# Patient Record
Sex: FEMALE | Race: WHITE | NOT HISPANIC OR LATINO | Employment: FULL TIME | URBAN - METROPOLITAN AREA
[De-identification: names, ages, dates, MRNs, and addresses within clinical notes are randomized per-mention and may not be internally consistent; named-entity substitution may affect disease eponyms.]

---

## 2017-02-20 ENCOUNTER — OFFICE VISIT (OUTPATIENT)
Dept: OBSTETRICS AND GYNECOLOGY | Facility: CLINIC | Age: 57
End: 2017-02-20

## 2017-02-20 ENCOUNTER — APPOINTMENT (OUTPATIENT)
Dept: MAMMOGRAPHY | Facility: CLINIC | Age: 57
End: 2017-02-20

## 2017-02-20 VITALS
BODY MASS INDEX: 28.32 KG/M2 | WEIGHT: 170 LBS | HEIGHT: 65 IN | DIASTOLIC BLOOD PRESSURE: 70 MMHG | SYSTOLIC BLOOD PRESSURE: 110 MMHG

## 2017-02-20 DIAGNOSIS — Z12.31 VISIT FOR SCREENING MAMMOGRAM: ICD-10-CM

## 2017-02-20 DIAGNOSIS — Z78.0 MENOPAUSE: ICD-10-CM

## 2017-02-20 DIAGNOSIS — Z12.11 SCREENING FOR MALIGNANT NEOPLASM OF COLON: ICD-10-CM

## 2017-02-20 DIAGNOSIS — Z80.3 FAMILY HISTORY OF BREAST CANCER IN MOTHER: ICD-10-CM

## 2017-02-20 DIAGNOSIS — Z01.419 ENCOUNTER FOR GYNECOLOGICAL EXAMINATION WITHOUT ABNORMAL FINDING: Primary | ICD-10-CM

## 2017-02-20 PROCEDURE — 99396 PREV VISIT EST AGE 40-64: CPT | Performed by: OBSTETRICS & GYNECOLOGY

## 2017-02-20 PROCEDURE — 77067 SCR MAMMO BI INCL CAD: CPT | Performed by: OBSTETRICS & GYNECOLOGY

## 2017-02-20 NOTE — PROGRESS NOTES
Subjective     Omaira Son is a 56 y.o. female for annual gyn exam    History of Present Illness   56-year-old white female status post total abdominal hysterectomy and unilateral salpingo-oophorectomy for uterine fibroids approximately 10 years ago presents for annual exam.  She has had mild vasomotor symptoms but is otherwise asymptomatic.  She has yet to obtain a screening colonoscopy.  Her mother was diagnosed with breast cancer at age 52.  The patient received a mammogram in the office today.  She has no complaints.      The following portions of the patient's history were reviewed and updated as appropriate: allergies, current medications, past family history, past medical history, past social history, past surgical history and problem list.      Review of Systems   Constitutional: Negative for activity change, appetite change, fatigue and unexpected weight change.   HENT: Negative.    Eyes: Negative.    Respiratory: Negative.    Cardiovascular: Negative.    Gastrointestinal: Negative for abdominal distention, abdominal pain, anal bleeding, constipation, diarrhea, nausea and vomiting.   Endocrine: Negative for cold intolerance, heat intolerance, polydipsia, polyphagia and polyuria.   Genitourinary: Negative for difficulty urinating, dysuria, flank pain, frequency, hematuria and urgency.   Musculoskeletal: Negative.    Skin: Negative.    Allergic/Immunologic: Negative.    Neurological: Negative.    Hematological: Negative.    Psychiatric/Behavioral: Negative.        Objective     Physical Exam   Constitutional: She is oriented to person, place, and time. Vital signs are normal. She appears well-developed and well-nourished. She is cooperative.   HENT:   Head: Normocephalic.   Eyes: Conjunctivae are normal. Pupils are equal, round, and reactive to light.   Neck: Normal range of motion. Neck supple. No tracheal deviation present. No thyromegaly present.   Cardiovascular: Normal rate, regular rhythm and normal  heart sounds.  Exam reveals no gallop and no friction rub.    No murmur heard.  Pulmonary/Chest: Effort normal and breath sounds normal. No respiratory distress. She has no wheezes. Right breast exhibits no inverted nipple, no mass, no nipple discharge, no skin change and no tenderness. Left breast exhibits no inverted nipple, no mass, no nipple discharge, no skin change and no tenderness. Breasts are symmetrical. There is no breast swelling.   Abdominal: Soft. Bowel sounds are normal. She exhibits no distension, no ascites and no mass. There is no hepatosplenomegaly. There is no tenderness. There is no rebound, no guarding and no CVA tenderness. No hernia. Hernia confirmed negative in the right inguinal area and confirmed negative in the left inguinal area.   Genitourinary: Rectal exam shows no fissure, no mass, no tenderness and anal tone normal. Pelvic exam was performed with patient supine. No labial fusion. There is no rash, tenderness, lesion or injury on the right labia. There is no rash, tenderness, lesion or injury on the left labia. No erythema, tenderness or bleeding in the vagina. No foreign body in the vagina. No signs of injury around the vagina. No vaginal discharge found.   Genitourinary Comments: Nose mild vulvovaginal atrophy.  The cuff is well-healed.  Bimanual exam is without mass or tenderness.   Musculoskeletal: Normal range of motion. She exhibits no edema or deformity.   Lymphadenopathy:     She has no cervical adenopathy.        Right: No inguinal adenopathy present.        Left: No inguinal adenopathy present.   Neurological: She is alert and oriented to person, place, and time. She has normal reflexes. No cranial nerve deficit. Coordination normal.   Skin: Skin is warm and dry. No rash noted. No erythema.   Psychiatric: She has a normal mood and affect. Her behavior is normal.         Omaira was seen today for gynecologic exam.    Diagnoses and all orders for this visit:    Encounter for  gynecological examination without abnormal finding  -     Pap IG, Rfx HPV ASCU    Menopause    Family history of breast cancer in mother    Screening for malignant neoplasm of colon  -     Ambulatory Referral to Gastroenterology    The patient has agreed to proceed with colonoscopy.  Appropriate referrals were made.  I recommended nutritional supplementation for bone health and the patient should get a baseline bone density screen next year.

## 2017-02-22 LAB
CONV .: NORMAL
CYTOLOGIST CVX/VAG CYTO: NORMAL
CYTOLOGY CVX/VAG DOC THIN PREP: NORMAL
DX ICD CODE: NORMAL
HIV 1 & 2 AB SER-IMP: NORMAL
OTHER STN SPEC: NORMAL
PATH REPORT.FINAL DX SPEC: NORMAL
STAT OF ADQ CVX/VAG CYTO-IMP: NORMAL

## 2018-03-26 ENCOUNTER — TRANSCRIBE ORDERS (OUTPATIENT)
Dept: ADMINISTRATIVE | Facility: HOSPITAL | Age: 58
End: 2018-03-26

## 2018-03-26 DIAGNOSIS — Z12.31 SCREENING MAMMOGRAM, ENCOUNTER FOR: Primary | ICD-10-CM

## 2018-04-16 ENCOUNTER — APPOINTMENT (OUTPATIENT)
Dept: MAMMOGRAPHY | Facility: HOSPITAL | Age: 58
End: 2018-04-16
Attending: OBSTETRICS & GYNECOLOGY

## 2018-04-16 ENCOUNTER — OFFICE VISIT (OUTPATIENT)
Dept: OBSTETRICS AND GYNECOLOGY | Facility: CLINIC | Age: 58
End: 2018-04-16

## 2018-04-16 ENCOUNTER — TRANSCRIBE ORDERS (OUTPATIENT)
Dept: ADMINISTRATIVE | Facility: HOSPITAL | Age: 58
End: 2018-04-16

## 2018-04-16 VITALS
HEIGHT: 65 IN | WEIGHT: 175 LBS | DIASTOLIC BLOOD PRESSURE: 78 MMHG | BODY MASS INDEX: 29.16 KG/M2 | SYSTOLIC BLOOD PRESSURE: 110 MMHG

## 2018-04-16 DIAGNOSIS — Z80.3 FAMILY HISTORY OF BREAST CANCER IN MOTHER: ICD-10-CM

## 2018-04-16 DIAGNOSIS — Z78.0 POST-MENOPAUSAL: Primary | ICD-10-CM

## 2018-04-16 DIAGNOSIS — Z01.419 ENCOUNTER FOR GYNECOLOGICAL EXAMINATION WITHOUT ABNORMAL FINDING: Primary | ICD-10-CM

## 2018-04-16 DIAGNOSIS — Z13.820 SCREENING FOR OSTEOPOROSIS: ICD-10-CM

## 2018-04-16 DIAGNOSIS — Z78.0 MENOPAUSE: ICD-10-CM

## 2018-04-16 PROCEDURE — 99396 PREV VISIT EST AGE 40-64: CPT | Performed by: OBSTETRICS & GYNECOLOGY

## 2018-04-16 NOTE — PROGRESS NOTES
Subjective    Omaira Son is a 57 y.o. female for annual gyn exam    History of Present Illness   57-year-old postmenopausal female presents for routine gynecologic exam.  She is status post hysterectomy with unilateral salpingo-oophorectomy for fibroids.  She is having some vasomotor symptoms but these are not disabling.  She has a family history of breast cancer in her mother.  There is no family history of colon cancer.  The patient has yet to obtain a screening colonoscopy.  She has no complaints.    The following portions of the patient's history were reviewed and updated as appropriate: allergies, current medications, past family history, past medical history, past social history, past surgical history and problem list.      Review of Systems   Constitutional: Negative for activity change, appetite change, fatigue and unexpected weight change.   HENT: Negative.    Eyes: Negative.    Respiratory: Negative.    Cardiovascular: Negative.    Gastrointestinal: Negative for abdominal distention, abdominal pain, anal bleeding, constipation, diarrhea, nausea and vomiting.   Endocrine: Negative for cold intolerance, heat intolerance, polydipsia, polyphagia and polyuria.   Genitourinary: Negative for difficulty urinating, dysuria, flank pain, frequency, hematuria, urgency, vaginal bleeding and vaginal discharge.   Musculoskeletal: Negative.    Skin: Negative.    Allergic/Immunologic: Negative.    Neurological: Negative.    Hematological: Negative.    Psychiatric/Behavioral: Negative.            Physical Exam   Constitutional: She is oriented to person, place, and time. Vital signs are normal. She appears well-developed and well-nourished. She is cooperative.   HENT:   Head: Normocephalic.   Eyes: Conjunctivae are normal. Pupils are equal, round, and reactive to light.   Neck: Normal range of motion. Neck supple. No tracheal deviation present. No thyromegaly present.   Cardiovascular: Normal rate, regular rhythm and  normal heart sounds.  Exam reveals no gallop and no friction rub.    No murmur heard.  Pulmonary/Chest: Effort normal and breath sounds normal. No respiratory distress. She has no wheezes. Right breast exhibits no inverted nipple, no mass, no nipple discharge, no skin change and no tenderness. Left breast exhibits no inverted nipple, no mass, no nipple discharge, no skin change and no tenderness. Breasts are symmetrical. There is no breast swelling.   Abdominal: Soft. Bowel sounds are normal. She exhibits no distension, no ascites and no mass. There is no hepatosplenomegaly. There is no tenderness. There is no rebound, no guarding and no CVA tenderness. No hernia. Hernia confirmed negative in the right inguinal area and confirmed negative in the left inguinal area.   Genitourinary: Rectal exam shows no external hemorrhoid, no fissure, no mass, no tenderness and anal tone normal. Pelvic exam was performed with patient supine. No labial fusion. There is no rash, tenderness, lesion or injury on the right labia. There is no rash, tenderness, lesion or injury on the left labia. Right adnexum displays no mass, no tenderness and no fullness. No erythema, tenderness or bleeding in the vagina. No foreign body in the vagina. No signs of injury around the vagina. No vaginal discharge found.   Genitourinary Comments: There is mild vulvovaginal atrophy.  The vaginal cuff is well-healed.  Bimanual exam is without mass or tenderness.   Musculoskeletal: Normal range of motion. She exhibits no edema or deformity.   Lymphadenopathy:     She has no cervical adenopathy.        Right: No inguinal adenopathy present.        Left: No inguinal adenopathy present.   Neurological: She is alert and oriented to person, place, and time. She has normal reflexes. No cranial nerve deficit. Coordination normal.   Skin: Skin is warm and dry. No rash noted. No erythema.   Psychiatric: She has a normal mood and affect. Her behavior is normal.          Omaira was seen today for gynecologic exam.    Diagnoses and all orders for this visit:    Encounter for gynecological examination without abnormal finding  -     Pap IG, Rfx HPV ASCU    Screening for osteoporosis  -     HM DEXA SCAN; Future    Menopause    Family history of breast cancer in mother     patient is doing well.  She has a screening mammogram scheduled.  We will arrange for a baseline DEXA scan.  I encouraged nutritional supplementation and weightbearing exercise for bone health.  Annual exams were recommended.

## 2018-05-08 ENCOUNTER — HOSPITAL ENCOUNTER (OUTPATIENT)
Dept: BONE DENSITY | Facility: HOSPITAL | Age: 58
Discharge: HOME | End: 2018-05-08
Attending: OBSTETRICS & GYNECOLOGY

## 2018-05-08 ENCOUNTER — APPOINTMENT (OUTPATIENT)
Dept: MAMMOGRAPHY | Facility: HOSPITAL | Age: 58
End: 2018-05-08
Attending: OBSTETRICS & GYNECOLOGY

## 2018-05-22 ENCOUNTER — HOSPITAL ENCOUNTER (OUTPATIENT)
Dept: MAMMOGRAPHY | Facility: HOSPITAL | Age: 58
Discharge: HOME | End: 2018-05-22
Attending: OBSTETRICS & GYNECOLOGY | Admitting: OBSTETRICS & GYNECOLOGY

## 2018-05-22 ENCOUNTER — HOSPITAL ENCOUNTER (OUTPATIENT)
Dept: BONE DENSITY | Facility: HOSPITAL | Age: 58
End: 2018-05-22
Attending: OBSTETRICS & GYNECOLOGY

## 2018-05-22 DIAGNOSIS — Z12.31 SCREENING MAMMOGRAM, ENCOUNTER FOR: ICD-10-CM

## 2018-05-22 PROCEDURE — 77063 BREAST TOMOSYNTHESIS BI: CPT

## 2018-05-22 PROCEDURE — 77067 SCR MAMMO BI INCL CAD: CPT

## 2018-06-18 ENCOUNTER — TELEPHONE (OUTPATIENT)
Dept: OBSTETRICS AND GYNECOLOGY | Facility: CLINIC | Age: 58
End: 2018-06-18

## 2018-06-18 NOTE — TELEPHONE ENCOUNTER
----- Message from Dexter De La Fuente MD sent at 6/14/2018  1:41 PM EDT -----  Regarding: RE: pt called regarding test tomorrow  Contact: [phone]  Tell Pt we can wait on dexa if she prefers.  She's low risk  ----- Message -----  From: Anya NEELA Garcia  Sent: 5/21/2018   1:41 PM  To: Dexter De La Fuente MD, hCely Ham MA  Subject: pt called regarding test tomorrow                Pt called today stating due to have a bone density tomorrow at the hospital but her insurance states will not cover until she turns 62. Wanted to know if there was another reason she was having this done earlier? Also, says you wanted her to have a colonoscopy and her insurance states will pay for a cologuard instead. Wanted a call back to know what to do for these. thx    Aware she can wait to get bone scan

## 2019-08-05 ENCOUNTER — PROCEDURE VISIT (OUTPATIENT)
Dept: OBSTETRICS AND GYNECOLOGY | Facility: CLINIC | Age: 59
End: 2019-08-05

## 2019-08-05 ENCOUNTER — APPOINTMENT (OUTPATIENT)
Dept: WOMENS IMAGING | Facility: HOSPITAL | Age: 59
End: 2019-08-05

## 2019-08-05 ENCOUNTER — OFFICE VISIT (OUTPATIENT)
Dept: OBSTETRICS AND GYNECOLOGY | Facility: CLINIC | Age: 59
End: 2019-08-05

## 2019-08-05 VITALS
SYSTOLIC BLOOD PRESSURE: 110 MMHG | BODY MASS INDEX: 28.32 KG/M2 | HEIGHT: 65 IN | WEIGHT: 170 LBS | DIASTOLIC BLOOD PRESSURE: 70 MMHG

## 2019-08-05 DIAGNOSIS — Z01.419 ENCOUNTER FOR GYNECOLOGICAL EXAMINATION WITHOUT ABNORMAL FINDING: Primary | ICD-10-CM

## 2019-08-05 DIAGNOSIS — Z78.0 MENOPAUSE: ICD-10-CM

## 2019-08-05 DIAGNOSIS — Z12.31 VISIT FOR SCREENING MAMMOGRAM: Primary | ICD-10-CM

## 2019-08-05 DIAGNOSIS — Z80.3 FAMILY HISTORY OF BREAST CANCER IN MOTHER: ICD-10-CM

## 2019-08-05 PROCEDURE — 77067 SCR MAMMO BI INCL CAD: CPT | Performed by: OBSTETRICS & GYNECOLOGY

## 2019-08-05 PROCEDURE — 99396 PREV VISIT EST AGE 40-64: CPT | Performed by: OBSTETRICS & GYNECOLOGY

## 2019-08-05 PROCEDURE — 77067 SCR MAMMO BI INCL CAD: CPT | Performed by: RADIOLOGY

## 2019-08-05 RX ORDER — ATORVASTATIN CALCIUM 10 MG/1
10 TABLET, FILM COATED ORAL DAILY
Refills: 0 | COMMUNITY
Start: 2019-06-04 | End: 2021-09-15

## 2019-08-05 NOTE — PROGRESS NOTES
Subjective    Omaira Son is a 58 y.o. female for annual gyn exam    History of Present Illness   58-year-old postmenopausal white female presents for routine gynecologic exam.  She denies vaginal bleeding.  She is still having some vasomotor symptoms.  She is status post TIMUR left S&O in 2006 for uterine fibroids.  The patient received mammogram in the office today.  She has noticed some thickening just underneath the right breast over the past several months.  She denies any nipple discharge or pain.  There is a family history of breast cancer in her mother in her mid 50s.  There is no family history of colon cancer.  The patient has yet to obtain a screening colonoscopy.      The following portions of the patient's history were reviewed and updated as appropriate: allergies, current medications, past family history, past medical history, past social history, past surgical history and problem list.      Review of Systems   Constitutional: Negative for activity change, appetite change, fatigue and unexpected weight change.   HENT: Negative.    Eyes: Negative.    Respiratory: Negative.    Cardiovascular: Negative.    Gastrointestinal: Negative for abdominal distention, abdominal pain, anal bleeding, constipation, diarrhea, nausea and vomiting.   Endocrine: Negative for cold intolerance, heat intolerance, polydipsia, polyphagia and polyuria.   Genitourinary: Negative for difficulty urinating, dysuria, flank pain, frequency, hematuria, pelvic pain, urgency, vaginal bleeding and vaginal discharge.   Musculoskeletal: Negative.    Skin: Negative.    Allergic/Immunologic: Negative.    Neurological: Negative.    Hematological: Negative.    Psychiatric/Behavioral: Negative.            Physical Exam   Constitutional: She is oriented to person, place, and time. Vital signs are normal. She appears well-developed and well-nourished. She is cooperative.   HENT:   Head: Normocephalic.   Eyes: Conjunctivae are normal. Pupils  are equal, round, and reactive to light.   Neck: Normal range of motion. Neck supple. No tracheal deviation present. No thyromegaly present.   Cardiovascular: Normal rate, regular rhythm and normal heart sounds. Exam reveals no gallop and no friction rub.   No murmur heard.  Pulmonary/Chest: Effort normal and breath sounds normal. No respiratory distress. She has no wheezes. Right breast exhibits no inverted nipple, no mass, no nipple discharge, no skin change and no tenderness. Left breast exhibits no inverted nipple, no mass, no nipple discharge, no skin change and no tenderness. Breasts are symmetrical. There is no breast swelling.   There is bilateral thickening of the breast tissue at the base of each breast which is more pronounced on the right than the left.  No definite masses were palpable.  There was no tenderness or nipple discharge.  The axilla are without mass.       Abdominal: Soft. Bowel sounds are normal. She exhibits no distension, no ascites and no mass. There is no hepatosplenomegaly. There is no tenderness. There is no rebound, no guarding and no CVA tenderness. No hernia. Hernia confirmed negative in the right inguinal area and confirmed negative in the left inguinal area.   Genitourinary: Rectal exam shows no fissure, no mass, no tenderness and anal tone normal. Pelvic exam was performed with patient supine. No labial fusion. There is no rash, tenderness, lesion or injury on the right labia. There is no rash, tenderness, lesion or injury on the left labia. Right adnexum displays no mass, no tenderness and no fullness. No erythema, tenderness or bleeding in the vagina. No foreign body in the vagina. No signs of injury around the vagina. No vaginal discharge found.   Genitourinary Comments: There is mild vulvovaginal atrophy.  The vaginal cuff is well-healed.  Bimanual exam is without mass or tenderness.   Musculoskeletal: Normal range of motion. She exhibits no edema or deformity.    Lymphadenopathy:     She has no cervical adenopathy.        Right: No inguinal adenopathy present.        Left: No inguinal adenopathy present.   Neurological: She is alert and oriented to person, place, and time. She has normal reflexes. No cranial nerve deficit. Coordination normal.   Skin: Skin is warm and dry. No rash noted. No erythema.   Psychiatric: She has a normal mood and affect. Her behavior is normal.         Omaira was seen today for gynecologic exam.    Diagnoses and all orders for this visit:    Encounter for gynecological examination without abnormal finding  -     Pap IG, Rfx HPV ASCU    Menopause    Family history of breast cancer in mother    Patient is doing well.  The breast exam was reassuring.  We will await mammogram results.  I encouraged nutritional supplementation and weightbearing exercise for bone health.  She will arrange a screening colonoscopy at her convenience.  Annual exams and mammograms were recommended.

## 2019-08-06 LAB
CONV .: NORMAL
CYTOLOGIST CVX/VAG CYTO: NORMAL
CYTOLOGY CVX/VAG DOC CYTO: NORMAL
CYTOLOGY CVX/VAG DOC THIN PREP: NORMAL
DX ICD CODE: NORMAL
HIV 1 & 2 AB SER-IMP: NORMAL
OTHER STN SPEC: NORMAL
STAT OF ADQ CVX/VAG CYTO-IMP: NORMAL

## 2019-08-08 DIAGNOSIS — N63.20 MASS OF LEFT BREAST ON MAMMOGRAM: Primary | ICD-10-CM

## 2019-08-09 ENCOUNTER — TELEPHONE (OUTPATIENT)
Dept: OBSTETRICS AND GYNECOLOGY | Facility: CLINIC | Age: 59
End: 2019-08-09

## 2019-08-09 NOTE — TELEPHONE ENCOUNTER
Discussed the mammogram findings from August 5 and the reasoning for further testing.  The patient was reassured.  Her mother had breast cancer.  She has the spot compression and ultrasound scheduled for August 15.

## 2019-08-15 ENCOUNTER — APPOINTMENT (OUTPATIENT)
Dept: WOMENS IMAGING | Facility: HOSPITAL | Age: 59
End: 2019-08-15

## 2019-08-15 DIAGNOSIS — N60.02 SOLITARY CYST OF LEFT BREAST: Primary | ICD-10-CM

## 2019-08-15 DIAGNOSIS — N63.20 MASS OF LEFT BREAST ON MAMMOGRAM: ICD-10-CM

## 2019-08-15 PROCEDURE — 76641 ULTRASOUND BREAST COMPLETE: CPT | Performed by: RADIOLOGY

## 2019-08-15 PROCEDURE — 77061 BREAST TOMOSYNTHESIS UNI: CPT | Performed by: RADIOLOGY

## 2019-08-15 PROCEDURE — G0279 TOMOSYNTHESIS, MAMMO: HCPCS | Performed by: RADIOLOGY

## 2019-08-15 PROCEDURE — 77065 DX MAMMO INCL CAD UNI: CPT | Performed by: RADIOLOGY

## 2019-08-15 PROCEDURE — MDREVIEWSP: Performed by: RADIOLOGY

## 2019-08-16 ENCOUNTER — TELEPHONE (OUTPATIENT)
Dept: OBSTETRICS AND GYNECOLOGY | Facility: CLINIC | Age: 59
End: 2019-08-16

## 2019-08-16 NOTE — TELEPHONE ENCOUNTER
We discussed the radiologic findings and recommendations.  The patient was reassured and is scheduled for the procedure on Monday.

## 2019-08-19 ENCOUNTER — APPOINTMENT (OUTPATIENT)
Dept: WOMENS IMAGING | Facility: HOSPITAL | Age: 59
End: 2019-08-19

## 2019-08-19 PROCEDURE — 19083 BX BREAST 1ST LESION US IMAG: CPT | Performed by: RADIOLOGY

## 2019-08-21 ENCOUNTER — TELEPHONE (OUTPATIENT)
Dept: OBSTETRICS AND GYNECOLOGY | Facility: CLINIC | Age: 59
End: 2019-08-21

## 2019-08-22 DIAGNOSIS — N60.02 SOLITARY CYST OF LEFT BREAST: ICD-10-CM

## 2019-12-18 ENCOUNTER — TELEPHONE (OUTPATIENT)
Dept: OBSTETRICS AND GYNECOLOGY | Facility: CLINIC | Age: 59
End: 2019-12-18

## 2019-12-18 ENCOUNTER — OFFICE VISIT (OUTPATIENT)
Dept: OBSTETRICS AND GYNECOLOGY | Facility: CLINIC | Age: 59
End: 2019-12-18

## 2019-12-18 VITALS
BODY MASS INDEX: 28.32 KG/M2 | DIASTOLIC BLOOD PRESSURE: 70 MMHG | HEIGHT: 65 IN | SYSTOLIC BLOOD PRESSURE: 110 MMHG | WEIGHT: 170 LBS

## 2019-12-18 DIAGNOSIS — N63.20 BREAST MASS, LEFT: Primary | ICD-10-CM

## 2019-12-18 DIAGNOSIS — Z80.3 FAMILY HISTORY OF BREAST CANCER IN MOTHER: ICD-10-CM

## 2019-12-18 PROCEDURE — 99213 OFFICE O/P EST LOW 20 MIN: CPT | Performed by: OBSTETRICS & GYNECOLOGY

## 2019-12-18 RX ORDER — IBUPROFEN 800 MG/1
800 TABLET ORAL EVERY 6 HOURS PRN
Qty: 30 TABLET | Refills: 0 | Status: SHIPPED | OUTPATIENT
Start: 2019-12-18 | End: 2020-08-31

## 2019-12-18 NOTE — TELEPHONE ENCOUNTER
Patient called and wants to know if you can call a prescription in for her for Ibuprofen 800 to the [facility]'s pharmacy on file.

## 2019-12-18 NOTE — PROGRESS NOTES
Subjective   Omaira Son is a 59 y.o. female with pain under left nipple    History of Present Illness   59-year-old postmenopausal white female presents with a one-week history of pain and a tender area just underneath her left nipple.  She denies any nipple discharge or skin changes.  She recently had a ultrasound-guided biopsy of the left breast at the 2 o'clock position in August 2019 which revealed fibroglandular adipose tissue.  There is a family history of breast cancer in her mother at age 57.    The following portions of the patient's history were reviewed and updated as appropriate: allergies, current medications, past family history, past medical history, past social history, past surgical history and problem list.    Review of Systems   Skin: Negative for color change.        No dimpling       Objective   Physical Exam   Pulmonary/Chest:       The breasts were examined in both the sitting and supine positions.  The breasts are equal in size, shape, and contour.  There are no visible skin changes.  The right breast reveals mild fibrocystic changes without dominant mass or tenderness.  The right axilla is without mass.  The left breast reveals diffuse fibrocystic changes and there is a tender thickening at 2:00 under the areola measuring approximately 8 mm.  There is no nipple discharge or skin changes or nipple cracking or bleeding.  The left axilla is without mass or tenderness.    Assessment/Plan   Omaira was seen today for follow-up.    Diagnoses and all orders for this visit:    Breast mass, left  -     Mammo Diagnostic Left With CAD; Future  -     US breast left complete; Future    Family history of breast cancer in mother    We discussed her symptoms and the findings.  I recommended we obtain a diagnostic left mammogram with ultrasound of the area under the left nipple.  She will begin vitamin E supplementation.  She will call for results.

## 2020-05-22 ENCOUNTER — APPOINTMENT (OUTPATIENT)
Dept: WOMENS IMAGING | Facility: HOSPITAL | Age: 60
End: 2020-05-22

## 2020-05-22 PROCEDURE — 76641 ULTRASOUND BREAST COMPLETE: CPT | Performed by: RADIOLOGY

## 2020-08-31 ENCOUNTER — OFFICE VISIT (OUTPATIENT)
Dept: OBSTETRICS AND GYNECOLOGY | Facility: CLINIC | Age: 60
End: 2020-08-31

## 2020-08-31 ENCOUNTER — PROCEDURE VISIT (OUTPATIENT)
Dept: OBSTETRICS AND GYNECOLOGY | Facility: CLINIC | Age: 60
End: 2020-08-31

## 2020-08-31 ENCOUNTER — APPOINTMENT (OUTPATIENT)
Dept: WOMENS IMAGING | Facility: HOSPITAL | Age: 60
End: 2020-08-31

## 2020-08-31 VITALS
BODY MASS INDEX: 28.32 KG/M2 | WEIGHT: 170 LBS | DIASTOLIC BLOOD PRESSURE: 70 MMHG | HEIGHT: 65 IN | SYSTOLIC BLOOD PRESSURE: 106 MMHG

## 2020-08-31 DIAGNOSIS — Z80.3 FAMILY HISTORY OF BREAST CANCER IN MOTHER: ICD-10-CM

## 2020-08-31 DIAGNOSIS — Z01.419 ENCOUNTER FOR GYNECOLOGICAL EXAMINATION WITHOUT ABNORMAL FINDING: Primary | ICD-10-CM

## 2020-08-31 DIAGNOSIS — Z12.31 VISIT FOR SCREENING MAMMOGRAM: Primary | ICD-10-CM

## 2020-08-31 DIAGNOSIS — Z78.0 MENOPAUSE: ICD-10-CM

## 2020-08-31 PROCEDURE — 77063 BREAST TOMOSYNTHESIS BI: CPT | Performed by: RADIOLOGY

## 2020-08-31 PROCEDURE — 99396 PREV VISIT EST AGE 40-64: CPT | Performed by: OBSTETRICS & GYNECOLOGY

## 2020-08-31 PROCEDURE — 77067 SCR MAMMO BI INCL CAD: CPT | Performed by: RADIOLOGY

## 2020-08-31 PROCEDURE — 77067 SCR MAMMO BI INCL CAD: CPT | Performed by: OBSTETRICS & GYNECOLOGY

## 2020-08-31 PROCEDURE — 77063 BREAST TOMOSYNTHESIS BI: CPT | Performed by: OBSTETRICS & GYNECOLOGY

## 2020-08-31 RX ORDER — CYCLOBENZAPRINE HCL 5 MG
TABLET ORAL
COMMUNITY
Start: 2020-08-01

## 2020-08-31 NOTE — PROGRESS NOTES
Subjective    Omaira Son is a 59 y.o. female for annual gyn exam    History of Present Illness   59-year-old postmenopausal white female presents for routine gynecologic exam.  She is status post TIMUR-left S&O for fibroids in 2006.  She has mild vasomotor symptoms and denies any vaginal bleeding.  She received a mammogram in the office this morning.  She has yet to obtain a screening colonoscopy.  There is a family history of breast cancer in her mother who had a mastectomy but is doing well.  There is no family history of colon cancer.  She has no complaints.      The following portions of the patient's history were reviewed and updated as appropriate: allergies, current medications, past family history, past medical history, past social history, past surgical history and problem list.      Review of Systems   Constitutional: Negative for activity change, appetite change, fatigue and unexpected weight change.   HENT: Negative.    Eyes: Negative.    Respiratory: Negative.    Cardiovascular: Negative.    Gastrointestinal: Negative for abdominal distention, abdominal pain, anal bleeding, constipation, diarrhea, nausea and vomiting.   Endocrine: Negative for cold intolerance, heat intolerance, polydipsia, polyphagia and polyuria.   Genitourinary: Negative for difficulty urinating, dysuria, flank pain, frequency, hematuria, pelvic pain, urgency, vaginal bleeding and vaginal discharge.   Musculoskeletal: Negative.    Skin: Negative.    Allergic/Immunologic: Negative.    Neurological: Negative.    Hematological: Negative.    Psychiatric/Behavioral: Negative.            Physical Exam   Constitutional: She is oriented to person, place, and time. Vital signs are normal. She appears well-developed and well-nourished. She is cooperative.   HENT:   Head: Normocephalic.   Eyes: Pupils are equal, round, and reactive to light. Conjunctivae are normal.   Neck: Normal range of motion. Neck supple. No tracheal deviation present.  No thyromegaly present.   Cardiovascular: Normal rate, regular rhythm and normal heart sounds. Exam reveals no gallop and no friction rub.   No murmur heard.  Pulmonary/Chest: Effort normal and breath sounds normal. No respiratory distress. She has no wheezes. Right breast exhibits no inverted nipple, no mass, no nipple discharge, no skin change and no tenderness. Left breast exhibits no inverted nipple, no mass, no nipple discharge, no skin change and no tenderness. No breast swelling. Breasts are symmetrical.   Abdominal: Soft. Bowel sounds are normal. She exhibits no distension, no ascites and no mass. There is no hepatosplenomegaly. There is no tenderness. There is no rebound, no guarding and no CVA tenderness. No hernia. Hernia confirmed negative in the right inguinal area and confirmed negative in the left inguinal area.   Genitourinary: Rectal exam shows no external hemorrhoid, no internal hemorrhoid, no fissure, no mass, no tenderness and anal tone normal. No breast swelling. Pelvic exam was performed with patient supine. No labial fusion. There is no rash, tenderness, lesion or injury on the right labia. There is no rash, tenderness, lesion or injury on the left labia. Right adnexum displays no mass, no tenderness and no fullness. No erythema, tenderness or bleeding in the vagina. No foreign body in the vagina. No signs of injury around the vagina. No vaginal discharge found.   Genitourinary Comments: The vaginal cuff is well-healed.  Bimanual exam is without mass or tenderness.   Musculoskeletal: Normal range of motion. She exhibits no edema or deformity.   Lymphadenopathy:     She has no cervical adenopathy.        Right: No inguinal adenopathy present.        Left: No inguinal adenopathy present.   Neurological: She is alert and oriented to person, place, and time. She has normal reflexes. No cranial nerve deficit. Coordination normal.   Skin: Skin is warm and dry. No rash noted. No erythema.    Psychiatric: She has a normal mood and affect. Her behavior is normal.         Omaira was seen today for gynecologic exam.    Diagnoses and all orders for this visit:    Encounter for gynecological examination without abnormal finding  -     Pap IG, Rfx HPV ASCU    Menopause    Family history of breast cancer in mother    The patient is doing well.  I recommended she schedule a screening colonoscopy.  I encourage nutritional supplementation and weightbearing exercise for bone health.  Annual exams and mammograms were recommended.

## 2021-08-13 ENCOUNTER — TELEPHONE (OUTPATIENT)
Dept: OBSTETRICS AND GYNECOLOGY | Facility: CLINIC | Age: 61
End: 2021-08-13

## 2021-09-01 ENCOUNTER — APPOINTMENT (OUTPATIENT)
Dept: WOMENS IMAGING | Facility: HOSPITAL | Age: 61
End: 2021-09-01

## 2021-09-01 ENCOUNTER — PROCEDURE VISIT (OUTPATIENT)
Dept: OBSTETRICS AND GYNECOLOGY | Facility: CLINIC | Age: 61
End: 2021-09-01

## 2021-09-01 DIAGNOSIS — Z12.31 VISIT FOR SCREENING MAMMOGRAM: Primary | ICD-10-CM

## 2021-09-01 PROCEDURE — 77063 BREAST TOMOSYNTHESIS BI: CPT | Performed by: OBSTETRICS & GYNECOLOGY

## 2021-09-01 PROCEDURE — 77067 SCR MAMMO BI INCL CAD: CPT | Performed by: RADIOLOGY

## 2021-09-01 PROCEDURE — 77067 SCR MAMMO BI INCL CAD: CPT | Performed by: OBSTETRICS & GYNECOLOGY

## 2021-09-01 PROCEDURE — 77063 BREAST TOMOSYNTHESIS BI: CPT | Performed by: RADIOLOGY

## 2021-09-07 DIAGNOSIS — R92.8 ABNORMAL MAMMOGRAM: Primary | ICD-10-CM

## 2021-09-08 ENCOUNTER — TELEPHONE (OUTPATIENT)
Dept: OBSTETRICS AND GYNECOLOGY | Facility: CLINIC | Age: 61
End: 2021-09-08

## 2021-09-15 ENCOUNTER — OFFICE VISIT (OUTPATIENT)
Dept: OBSTETRICS AND GYNECOLOGY | Facility: CLINIC | Age: 61
End: 2021-09-15

## 2021-09-15 VITALS
WEIGHT: 163.4 LBS | DIASTOLIC BLOOD PRESSURE: 70 MMHG | SYSTOLIC BLOOD PRESSURE: 128 MMHG | BODY MASS INDEX: 30.07 KG/M2 | HEIGHT: 62 IN

## 2021-09-15 DIAGNOSIS — Z01.419 ENCOUNTER FOR GYNECOLOGICAL EXAMINATION WITHOUT ABNORMAL FINDING: Primary | ICD-10-CM

## 2021-09-15 PROCEDURE — 99396 PREV VISIT EST AGE 40-64: CPT | Performed by: OBSTETRICS & GYNECOLOGY

## 2021-09-15 RX ORDER — EZETIMIBE 10 MG/1
10 TABLET ORAL DAILY
COMMUNITY
Start: 2021-06-25

## 2021-09-15 NOTE — PROGRESS NOTES
GYN Annual Exam     CC- Here for annual exam.     Omaira Son is a 60 y.o. female who presents for annual well woman exam. She: is menopausal.  She is experiencing and/or reports much less in the way of hot flashes..  She denies postmenopausal vaginal bleeding.  She denies vomiting, abdominal pain and cough.  Today, she wishes to specifically address just her recent mammogram for which she has a follow-up diagnostic mammogram and ultrasound scheduled in early October..  I explained to her that current ACOG guidelines state that screening Pap smears are no longer recommended after the age of 65, nor after hysterectomy for benign reasons.  After this discussion, she expresses a desire to have a Pap screen done today.    OB History        2    Para   2    Term   2            AB        Living           SAB        TAB        Ectopic        Molar        Multiple        Live Births                    Current contraception: post menopausal status  History of abnormal Pap smear: no  Family history of uterine, colon or ovarian cancer: no  History of abnormal mammogram: yes - History of left breast biopsy  Family history of breast cancer: yes - Mother  Last Pap : 1 year ago  Last mammogram: Several weeks ago  Last colonoscopy:   Last DEXA: Not yet done      Past Medical History:   Diagnosis Date   • Fibroid        Past Surgical History:   Procedure Laterality Date   • HYSTERECTOMY           Current Outpatient Medications:   •  cyclobenzaprine (FLEXERIL) 5 MG tablet, TK 1 T PO BID PRN, Disp: , Rfl:   •  ezetimibe (ZETIA) 10 MG tablet, Take 10 mg by mouth Daily., Disp: , Rfl:     No Known Allergies    Social History     Tobacco Use   • Smoking status: Never Smoker   • Smokeless tobacco: Never Used   Vaping Use   • Vaping Use: Never used   Substance Use Topics   • Alcohol use: No   • Drug use: No       Family History   Problem Relation Age of Onset   • Breast cancer Mother        Review of Systems  "  Constitutional: Negative for fatigue and fever.   Respiratory: Negative for cough and shortness of breath.    Genitourinary: Negative for decreased urine volume, urinary incontinence and vaginal bleeding.       /70   Ht 157.5 cm (62\")   Wt 74.1 kg (163 lb 6.4 oz)   BMI 29.89 kg/m²     Physical Exam  Constitutional:       Appearance: She is normal weight.   Genitourinary:      Vulva, inguinal canal, urethra and bladder normal.      Bladder is not tender.      Bladder neck is mobile.      Vaginal prolapse present.      Vaginal exam comments: Stage I cystocele and rectocele.      Cervix is absent.      Uterus is absent.      No right or left adnexal mass present.      Right adnexa not tender.      Left adnexa not tender.   Neck:      Thyroid: No thyroid mass or thyromegaly.   Chest:      Breasts:         Right: No mass, nipple discharge, skin change or tenderness.         Left: No mass, nipple discharge, skin change or tenderness.   Abdominal:      General: Abdomen is flat.      Palpations: Abdomen is soft.      Tenderness: There is no abdominal tenderness.   Neurological:      Mental Status: She is alert.   Vitals reviewed.             Assessment     1) GYN annual well woman exam.   2) menopausal.  Doing very well.  3) mild pelvic organ prolapse discussed with patient.  No change in management at this time.     Plan     1) Breast Health - Clinical breast exam & mammogram reviewed specifically American Cancer Society recommendations for screening specific to her, and Self breast awareness monthly  2) Pap -done of the vaginal cuff at patient's request.  He would like to do this every other year  3) Smoking status-non-smoker  4) Colon health - screening colonoscopy recommended if not up to date  5) Bone health - Weight bearing exercise, dietary calcium recommendations and vitamin D reviewed.   6) Encouraged to be wary of information obtained via social media and internet based on source and search.   7) Follow " up prn and one year      Presley Joel MD   9/15/2021  09:46 EDT

## 2021-09-17 LAB
CONV .: NORMAL
CYTOLOGIST CVX/VAG CYTO: NORMAL
CYTOLOGY CVX/VAG DOC CYTO: NORMAL
DX ICD CODE: NORMAL
HIV 1 & 2 AB SER-IMP: NORMAL
OTHER STN SPEC: NORMAL
STAT OF ADQ CVX/VAG CYTO-IMP: NORMAL

## 2021-10-06 ENCOUNTER — APPOINTMENT (OUTPATIENT)
Dept: WOMENS IMAGING | Facility: HOSPITAL | Age: 61
End: 2021-10-06

## 2021-10-06 PROCEDURE — 77065 DX MAMMO INCL CAD UNI: CPT | Performed by: RADIOLOGY

## 2021-10-06 PROCEDURE — 77061 BREAST TOMOSYNTHESIS UNI: CPT | Performed by: RADIOLOGY

## 2021-10-06 PROCEDURE — G0279 TOMOSYNTHESIS, MAMMO: HCPCS | Performed by: RADIOLOGY

## 2021-10-06 PROCEDURE — 76641 ULTRASOUND BREAST COMPLETE: CPT | Performed by: RADIOLOGY

## 2021-10-08 ENCOUNTER — TELEPHONE (OUTPATIENT)
Dept: OBSTETRICS AND GYNECOLOGY | Facility: CLINIC | Age: 61
End: 2021-10-08

## 2021-10-08 DIAGNOSIS — R92.8 ABNORMAL MAMMOGRAM: ICD-10-CM

## 2021-10-08 DIAGNOSIS — Z80.3 FAMILY HISTORY OF BREAST CANCER IN MOTHER: ICD-10-CM

## 2021-10-08 DIAGNOSIS — N63.0 BREAST MASS IN FEMALE: Primary | ICD-10-CM

## 2021-10-08 NOTE — TELEPHONE ENCOUNTER
Daphney with [facility] called to request orders for Right Breast Cyst Aspiration w/Possible Bx.  She is faxing the report today, pt is not yet aware.

## 2021-10-18 DIAGNOSIS — N63.10 BREAST MASS, RIGHT: ICD-10-CM

## 2021-10-18 DIAGNOSIS — N64.89 BREAST ASYMMETRY: ICD-10-CM

## 2021-10-18 DIAGNOSIS — R92.8 ABNORMALITY OF RIGHT BREAST ON SCREENING MAMMOGRAM: Primary | ICD-10-CM

## 2021-10-18 DIAGNOSIS — R92.8 ABNORMAL MAMMOGRAM OF RIGHT BREAST: Primary | ICD-10-CM

## 2021-10-19 NOTE — TELEPHONE ENCOUNTER
Pt scheduled her appt for Breast Biopsy at [facility] 10/25/21.  SR    I cancelled the original referral.  Entered it correctly.

## 2021-10-25 ENCOUNTER — APPOINTMENT (OUTPATIENT)
Dept: WOMENS IMAGING | Facility: HOSPITAL | Age: 61
End: 2021-10-25

## 2021-10-25 PROCEDURE — 76942 ECHO GUIDE FOR BIOPSY: CPT | Performed by: RADIOLOGY

## 2021-10-25 PROCEDURE — 19000 PUNCTURE ASPIR CYST BREAST: CPT | Performed by: RADIOLOGY

## 2021-11-01 DIAGNOSIS — N63.10 BREAST MASS, RIGHT: ICD-10-CM

## 2021-11-01 DIAGNOSIS — R92.8 ABNORMAL MAMMOGRAM OF RIGHT BREAST: ICD-10-CM

## 2022-09-13 ENCOUNTER — APPOINTMENT (OUTPATIENT)
Dept: WOMENS IMAGING | Facility: HOSPITAL | Age: 62
End: 2022-09-13

## 2022-09-13 ENCOUNTER — PROCEDURE VISIT (OUTPATIENT)
Dept: OBSTETRICS AND GYNECOLOGY | Facility: CLINIC | Age: 62
End: 2022-09-13

## 2022-09-13 DIAGNOSIS — Z12.31 VISIT FOR SCREENING MAMMOGRAM: Primary | ICD-10-CM

## 2022-09-13 PROCEDURE — 77063 BREAST TOMOSYNTHESIS BI: CPT | Performed by: OBSTETRICS & GYNECOLOGY

## 2022-09-13 PROCEDURE — 77067 SCR MAMMO BI INCL CAD: CPT | Performed by: RADIOLOGY

## 2022-09-13 PROCEDURE — 77067 SCR MAMMO BI INCL CAD: CPT | Performed by: OBSTETRICS & GYNECOLOGY

## 2022-09-13 PROCEDURE — 77063 BREAST TOMOSYNTHESIS BI: CPT | Performed by: RADIOLOGY

## 2023-10-03 ENCOUNTER — OFFICE VISIT (OUTPATIENT)
Dept: OBSTETRICS AND GYNECOLOGY | Facility: CLINIC | Age: 63
End: 2023-10-03
Payer: COMMERCIAL

## 2023-10-03 ENCOUNTER — PROCEDURE VISIT (OUTPATIENT)
Dept: OBSTETRICS AND GYNECOLOGY | Facility: CLINIC | Age: 63
End: 2023-10-03
Payer: COMMERCIAL

## 2023-10-03 VITALS
DIASTOLIC BLOOD PRESSURE: 74 MMHG | WEIGHT: 174 LBS | SYSTOLIC BLOOD PRESSURE: 124 MMHG | BODY MASS INDEX: 32.02 KG/M2 | HEIGHT: 62 IN

## 2023-10-03 DIAGNOSIS — Z01.419 ENCOUNTER FOR ANNUAL ROUTINE GYNECOLOGICAL EXAMINATION: Primary | ICD-10-CM

## 2023-10-03 DIAGNOSIS — Z12.31 VISIT FOR SCREENING MAMMOGRAM: Primary | ICD-10-CM

## 2023-10-03 RX ORDER — LOVASTATIN 20 MG/1
20 TABLET ORAL DAILY
COMMUNITY

## 2023-10-03 NOTE — PROGRESS NOTES
GYN Annual Exam     CC- Here for annual exam.     Omaira Son is a 62 y.o. female who presents for annual well woman exam. She is menopausal.  She is experiencing and/or reports no new or bothersome menopausal symptoms.  She denies postmenopausal vaginal bleeding.  She denies abdominal pain, diarrhea, and cough.  Today, she wishes to specifically address No.  I explained to her that current ACOG guidelines state that screening Pap smears are no longer recommended after the age of 65, nor after hysterectomy for benign reasons.    OB History          2    Para   2    Term   2            AB        Living             SAB        IAB        Ectopic        Molar        Multiple        Live Births                    Current contraception: status post hysterectomy  History of abnormal Pap smear: no  Family history of uterine, colon or ovarian cancer: no  History of abnormal mammogram: yes - history of left breast biopsy  Family history of breast cancer: yes - her mother  Last Pap :   Last mammogram: Done today  Last colonoscopy:   Last DEXA: Not yet done      Past Medical History:   Diagnosis Date    Fibroid        Past Surgical History:   Procedure Laterality Date    HYSTERECTOMY      US GUIDED CYST ASPIRATION BREAST N/A 10/25/2021         Current Outpatient Medications:     lovastatin (MEVACOR) 20 MG tablet, Take 1 tablet by mouth Daily., Disp: , Rfl:     cyclobenzaprine (FLEXERIL) 5 MG tablet, TK 1 T PO BID PRN (Patient not taking: Reported on 10/3/2023), Disp: , Rfl:     No Known Allergies    Social History     Tobacco Use    Smoking status: Never    Smokeless tobacco: Never   Vaping Use    Vaping Use: Never used   Substance Use Topics    Alcohol use: No    Drug use: No       Family History   Problem Relation Age of Onset    Breast cancer Mother     Kidney cancer Mother     Ovarian cancer Neg Hx     Uterine cancer Neg Hx     Colon cancer Neg Hx        Review of Systems   Constitutional:   "Negative for fatigue and fever.   Respiratory:  Negative for cough and shortness of breath.    Genitourinary:  Positive for urinary incontinence. Negative for pelvic pain and vaginal bleeding.     /74   Ht 157.5 cm (62\")   Wt 78.9 kg (174 lb)   BMI 31.83 kg/m²     Physical Exam  Constitutional:       Appearance: She is normal weight.   Genitourinary:      Bladder and urethral meatus normal.      No lesions in the vagina.      Right Labia: No lesions.     Left Labia: No lesions.     No vaginal discharge, tenderness or bleeding.      Anterior and posterior vaginal prolapse present.     Mild vaginal atrophy present.       Right Adnexa: not tender, not full and no mass present.     Left Adnexa: not tender, not full and no mass present.     Cervix is absent.      Uterus is absent.   Breasts:     Right: No mass, nipple discharge, skin change or tenderness.      Left: No mass, nipple discharge, skin change or tenderness.   Neck:      Thyroid: No thyroid mass or thyromegaly.   Abdominal:      General: Abdomen is flat.      Palpations: Abdomen is soft. There is no mass.      Tenderness: There is no abdominal tenderness.   Neurological:      Mental Status: She is alert.   Vitals reviewed.           Assessment     1) GYN annual well woman exam.   2) mild pelvic organ prolapse with mild stress urinary incontinence.  Discussion with the patient regarding pelvic floor strengthening and following her symptoms.  She currently does not need to wear a pad on a daily basis and states this is only an occasional issue.  She will notify me if it gets worse and I would recommend formal pelvic floor physical therapy as a first treatment option if that time comes.     Plan     1) Breast Health - Clinical breast exam & mammogram reviewed specifically American Cancer Society recommendations for screening specific to her, and Self breast awareness monthly  2) Pap -done at the vaginal cuff at patient's request.  3) Smoking " status-negative  4) Colon health - screening colonoscopy recommended if not up to date  5) Bone health - Weight bearing exercise, dietary calcium recommendations and vitamin D reviewed.   6) Encouraged to be wary of information obtained via social media and internet based on source and search.   7) Follow up prn and one year      Presley Joel MD   10/3/2023  09:57 EDT

## 2023-10-06 LAB
CONV .: NORMAL
CYTOLOGIST CVX/VAG CYTO: NORMAL
CYTOLOGY CVX/VAG DOC CYTO: NORMAL
CYTOLOGY CVX/VAG DOC THIN PREP: NORMAL
DX ICD CODE: NORMAL
HIV 1 & 2 AB SER-IMP: NORMAL
Lab: NORMAL
OTHER STN SPEC: NORMAL
STAT OF ADQ CVX/VAG CYTO-IMP: NORMAL

## 2024-10-22 ENCOUNTER — PROCEDURE VISIT (OUTPATIENT)
Dept: OBSTETRICS AND GYNECOLOGY | Facility: CLINIC | Age: 64
End: 2024-10-22
Payer: COMMERCIAL

## 2024-10-22 ENCOUNTER — TELEPHONE (OUTPATIENT)
Dept: OBSTETRICS AND GYNECOLOGY | Facility: CLINIC | Age: 64
End: 2024-10-22
Payer: COMMERCIAL

## 2024-10-22 DIAGNOSIS — Z12.31 VISIT FOR SCREENING MAMMOGRAM: Primary | ICD-10-CM

## 2024-10-22 DIAGNOSIS — N95.1 MENOPAUSAL SYMPTOMS: Primary | ICD-10-CM

## 2024-10-22 NOTE — TELEPHONE ENCOUNTER
Patient scheduled for labs this afternoon. Wanted me to let you know that she saw PCP and he put her on conidine 1 mg four days ago.

## 2024-10-22 NOTE — TELEPHONE ENCOUNTER
Patient having severe hot flashes. Wanted to know if could have labs to check hormone levels. She has Mammogram appointment today at 1:00 and wondering if she could have labs drawn today? Pt Ph [phone]

## 2024-10-23 LAB
ESTRADIOL SERPL-MCNC: <5 PG/ML (ref 0–54.7)
FSH SERPL-ACNC: 62.2 MIU/ML (ref 25.8–134.8)
LH SERPL-ACNC: 40.3 MIU/ML (ref 7.7–58.5)

## 2024-10-24 ENCOUNTER — TELEPHONE (OUTPATIENT)
Dept: OBSTETRICS AND GYNECOLOGY | Facility: CLINIC | Age: 64
End: 2024-10-24
Payer: COMMERCIAL

## 2024-10-24 NOTE — TELEPHONE ENCOUNTER
Caller: Omaira Son    Relationship: Self    Best call back number: [phone] (home)      Caller requesting test results: LABS    What test was performed: FSH & LH, ESTRADIOL    When was the test performed: 10/22/24    Where was the test performed: OFFICE

## 2024-10-24 NOTE — TELEPHONE ENCOUNTER
Pt aware    Pt wanting to discuss Estrogen levels. She states feels her collagen and skin elasticity are off/changing due to estrogen.     Please advise, thanks

## 2024-10-25 NOTE — TELEPHONE ENCOUNTER
PATIENT CALLED BACK, WANTED TO DISCUSS HER ESTROGEN LEVELS. PATIENT WANTS TO SPEAK TO SOMEONE TODAY, PLEASE ASSIST.

## 2024-10-25 NOTE — TELEPHONE ENCOUNTER
Left message for Omaira that Dr Joel said that your labs just confirm that you are in menopause. There is nothing abnormal about the labs. He said if you want to discuss your labs and go on HRT, to schedule an appointment. Thank you

## 2024-10-29 ENCOUNTER — TELEPHONE (OUTPATIENT)
Dept: OBSTETRICS AND GYNECOLOGY | Facility: CLINIC | Age: 64
End: 2024-10-29

## 2024-10-29 NOTE — TELEPHONE ENCOUNTER
Caller: Omaira Son    Relationship: Self    Best call back number: [phone]    What is the best time to reach you: ASAP    Who are you requesting to speak with (clinical staff, provider,  specific staff member):     NURSE OR MA    What was the call regarding:     PT HAD MAMMO ON 10/22/2024     AND SHE WOULD LIKE  TO KNOW RESULTS

## 2024-11-06 ENCOUNTER — OFFICE VISIT (OUTPATIENT)
Dept: OBSTETRICS AND GYNECOLOGY | Facility: CLINIC | Age: 64
End: 2024-11-06
Payer: COMMERCIAL

## 2024-11-06 VITALS
BODY MASS INDEX: 34.81 KG/M2 | DIASTOLIC BLOOD PRESSURE: 69 MMHG | SYSTOLIC BLOOD PRESSURE: 105 MMHG | HEART RATE: 74 BPM | HEIGHT: 55 IN | WEIGHT: 150.4 LBS

## 2024-11-06 DIAGNOSIS — N95.1 MENOPAUSAL SYMPTOMS: Primary | ICD-10-CM

## 2024-11-06 RX ORDER — CLONIDINE HYDROCHLORIDE 0.1 MG/1
0.1 TABLET ORAL
COMMUNITY
Start: 2024-10-15 | End: 2025-02-12

## 2024-11-06 RX ORDER — ESTRADIOL 0.05 MG/D
1 PATCH, EXTENDED RELEASE TRANSDERMAL 2 TIMES WEEKLY
Qty: 8 PATCH | Refills: 3 | Status: SHIPPED | OUTPATIENT
Start: 2024-11-07

## 2024-11-06 NOTE — PROGRESS NOTES
"        REASON FOR FOLLOWUP/CHIEF COMPLAINT: Pt wants to discuss about hormone levels )      HISTORY OF PRESENT ILLNESS: Patient with a history of having had hysterectomy and 1 ovary removed in the past.  She thinks that she started going through menopause in her late 50s with hot flashes.  She describes these hot flashes and is getting severely worse in the past several weeks.  She had inquired about hormone blood levels which we did a couple weeks ago that were consistent with menopause range estradiol and FSH, confirming her menopause state.  Her primary care provider placed her on clonidine as an attempt to help with the hot flashes and she has been on that for about a month and states that they have not really helped that much.  She also describes skin changes and decreased elasticity that she is noted.  She is lost 30 pounds recently voluntarily as an effort to lower her hemoglobin A1c.   We discussed a trial of low-dose transdermal estradiol.  I discussed the risks of cardiovascular events, stroke, deep vein thrombosis and potential long-term risks of breast cancer being increased slightly.  I discussed the use of the estrogen and not needing to take a progestin because her uterus has been removed.      Past Medical History, Past Surgical History, Social History, Family History have been reviewed and are without significant changes except as mentioned.    Review of Systems   Review of Systems   Constitutional:  Positive for diaphoresis. Negative for fatigue.   Genitourinary:  Negative for pelvic pain and vaginal bleeding.       Medications:  The current medication list was reviewed in the EMR    ALLERGIES:  No Known Allergies           Vitals:    11/06/24 1428   BP: 105/69   Pulse: 74   Weight: 68.2 kg (150 lb 6.4 oz)   Height: 62 cm (24.41\")     Physical Exam    CONSTITUTIONAL:  Vital signs reviewed.  No distress, looks comfortable.  EYES:  Conjunctiva and lids unremarkable.  PERRLA  EARS,NOSE,MOUTH,THROAT:  " Ears and nose appear unremarkable.  Lips, teeth, gums appear unremarkable.  RESPIRATORY:  Normal respiratory effort.  Lungs clear to auscultation bilaterally.  CARDIOVASCULAR:  Normal S1, S2.  No murmurs rubs or gallops.  No significant lower extremity edema.  GASTROINTESTINAL: Abdomen appears unremarkable.  Nontender.  No hepatomegaly.  No splenomegaly.  NEURO: cranial nerves 2-12 grossly intact.  No focal deficits.  Appears to have equal strength all 4 extremities.  MUSCULOSKELETAL:  Unremarkable digits/nails.  No cyanosis or clubbing.  SKIN:  Warm.  No rashes.  PSYCHIATRIC:  Normal judgment and insight.  Normal mood and affect.       RECENT LABS:  WBC   Date Value Ref Range Status   10/10/2024 10-20 (A) < OR = 5 /HPF Final     Hemoglobin   Date Value Ref Range Status   09/30/2024 13.6 12.0 - 16.0 Gram/dL Final       ASSESSMENT/PLAN:  Omaira Son [unfilled]   1.  Menopausal symptoms, severe.  Will stop the clonidine and start Minivelle 0.05 mg patch twice weekly.  Will see her back in 1 month to gauge her symptom response and make adjustments in dose as needed.

## 2024-12-04 ENCOUNTER — OFFICE VISIT (OUTPATIENT)
Dept: OBSTETRICS AND GYNECOLOGY | Facility: CLINIC | Age: 64
End: 2024-12-04
Payer: COMMERCIAL

## 2024-12-04 VITALS
BODY MASS INDEX: 26.16 KG/M2 | HEART RATE: 73 BPM | WEIGHT: 157 LBS | DIASTOLIC BLOOD PRESSURE: 71 MMHG | SYSTOLIC BLOOD PRESSURE: 119 MMHG | HEIGHT: 65 IN

## 2024-12-04 DIAGNOSIS — N95.1 MENOPAUSAL SYMPTOMS: Primary | ICD-10-CM

## 2024-12-04 RX ORDER — ESTRADIOL 0.05 MG/D
1 PATCH, EXTENDED RELEASE TRANSDERMAL 2 TIMES WEEKLY
Qty: 24 PATCH | Refills: 3 | Status: SHIPPED | OUTPATIENT
Start: 2024-12-05

## 2024-12-04 NOTE — PROGRESS NOTES
"        REASON FOR FOLLOWUP/CHIEF COMPLAINT:  (Patient is here regarding menopause symptoms and taken Minivelle 0.05 mg patch twice weekly)      HISTORY OF PRESENT ILLNESS: Patient is here 1 month after starting estradiol patch.  She was able to come off of the clonidine without a problem and states that she is doing much much better on the estradiol patch.  She still does experience some hot flashes but they are nowhere near as severe or frequent and wishes to stay on the current dose.      Past Medical History, Past Surgical History, Social History, Family History have been reviewed and are without significant changes except as mentioned.    Review of Systems   Review of Systems    Medications:  The current medication list was reviewed in the EMR    ALLERGIES:    Allergies   Allergen Reactions    Atorvastatin Myalgia              Vitals:    12/04/24 1309   BP: 119/71   Pulse: 73   Weight: 71.2 kg (157 lb)   Height: 165.1 cm (65\")     Physical Exam    CONSTITUTIONAL:  Vital signs reviewed.  No distress, looks comfortable.    PSYCHIATRIC:  Normal judgment and insight.  Normal mood and affect.       RECENT LABS:  WBC   Date Value Ref Range Status   10/10/2024 10-20 (A) < OR = 5 /HPF Final     Hemoglobin   Date Value Ref Range Status   09/30/2024 13.6 12.0 - 16.0 Gram/dL Final       ASSESSMENT/PLAN:  Omaira Son [unfilled]   1.  Menopause symptoms, significantly improved on low-dose estradiol patch.  Will continue the transdermal estrogen at 0.05 mg  "

## 2025-03-31 RX ORDER — ESTRADIOL 0.05 MG/D
1 PATCH, EXTENDED RELEASE TRANSDERMAL 2 TIMES WEEKLY
Qty: 24 PATCH | Refills: 3 | Status: SHIPPED | OUTPATIENT
Start: 2025-03-31

## 2025-03-31 NOTE — TELEPHONE ENCOUNTER
Pt requesting patch refill please.   Pharmacy -  To be filled at: [facility] [id number] - [facility] [id number] [facility] - [phone]  - [phone] FX